# Patient Record
Sex: FEMALE | Race: WHITE | NOT HISPANIC OR LATINO | Employment: PART TIME | URBAN - METROPOLITAN AREA
[De-identification: names, ages, dates, MRNs, and addresses within clinical notes are randomized per-mention and may not be internally consistent; named-entity substitution may affect disease eponyms.]

---

## 2017-08-28 ENCOUNTER — ALLSCRIPTS OFFICE VISIT (OUTPATIENT)
Dept: OTHER | Facility: OTHER | Age: 22
End: 2017-08-28

## 2017-09-11 ENCOUNTER — ALLSCRIPTS OFFICE VISIT (OUTPATIENT)
Dept: OTHER | Facility: OTHER | Age: 22
End: 2017-09-11

## 2017-09-11 LAB — S PYO AG THROAT QL: POSITIVE

## 2018-01-18 NOTE — PROGRESS NOTES
Assessment    1  Encounter for preventive health examination (V70 0) (Z00 00)    Discussion/Summary  health maintenance visit Currently, she eats a healthy diet  Breast cancer screening: breast cancer screening is not indicated  Colorectal cancer screening: colorectal cancer screening is not indicated  The PT UNSURE OF TETANUS SHOT  Advice and education were given regarding contraception  Patient discussion: discussed with the patient  PATIENT HERE TO EST AS A NEW PATIENT   FOLLOW UP WITH GYN FOR POSSIBLE ? PCOS? - FOLLOW UP YEARLY   LABS AND ER VISIT REVIEWED  The patient was counseled regarding diagnostic results, instructions for management, risk factor reductions, prognosis, patient and family education, impressions, risks and benefits of treatment options, importance of compliance with treatment  Chief Complaint  PATIENT HERE TO EST AS A NEW PATIENT; History of Present Illness  HM, Adult Female: The patient is being seen for a health maintenance evaluation  The last health maintenance visit was month(s) ago  General Health: The patient's health since the last visit is described as good  She has regular dental visits  She denies vision problems  She denies hearing loss  Immunizations status: up to date  Lifestyle:  She exercises regularly  She does not use tobacco  She denies alcohol use  She denies drug use  Reproductive health: the patient is premenopausal   she reports normal menses  she uses no contraception  she is not sexually active  she denies prior pregnancies  Screening: cancer screening reviewed and current  metabolic screening reviewed and current  risk screening reviewed and current  HPI: PATIENT HERE TO EST AS A NEW PATIENT  SHE WAS DX WITH POSSIBLE      Review of Systems    Constitutional: No fever, no chills, feels well, no tiredness, no recent weight gain or weight loss     Eyes: No complaints of eye pain, no red eyes, no eyesight problems, no discharge, no dry eyes, no itching of eyes  ENT: no complaints of earache, no loss of hearing, no nose bleeds, no nasal discharge, no sore throat, no hoarseness  Cardiovascular: No complaints of slow heart rate, no fast heart rate, no chest pain, no palpitations, no leg claudication, no lower extremity edema, the heart rate was not slow, no chest pain, the heart rate was not fast and no palpitations  Respiratory: No complaints of shortness of breath, no wheezing, no cough, no SOB on exertion, no orthopnea, no PND  Gastrointestinal: No complaints of abdominal pain, no constipation, no nausea or vomiting, no diarrhea, no bloody stools, no abdominal pain, no nausea, no vomiting, no constipation and no diarrhea  Genitourinary: [facility] 7/20/2017, but No complaints of dysuria, no incontinence, no pelvic pain, no dysmenorrhea, no vaginal discharge or bleeding, no dysuria, no pelvic pain and no vaginal discharge  Musculoskeletal: No complaints of arthralgias, no myalgias, no joint swelling or stiffness, no limb pain or swelling, no arthralgias and no myalgias  Integumentary: No complaints of skin rash or lesions, no itching, no skin wounds, no breast pain or lump, no rashes, no skin lesions and no skin wound  Neurological: No complaints of headache, no confusion, no convulsions, no numbness, no dizziness or fainting, no tingling, no limb weakness, no difficulty walking, no confusion, no dizziness, no convulsions and no fainting  Psychiatric: Not suicidal, no sleep disturbance, no anxiety or depression, no change in personality, no emotional problems, no anxiety, no sleep disturbances and no depression  Endocrine: No complaints of proptosis, no hot flashes, no muscle weakness, no deepening of the voice, no feelings of weakness, no hot flashes and no deepening of the voice   no feelings of weakness   Hematologic/Lymphatic: No complaints of swollen glands, no swollen glands in the neck, does not bleed easily, does not bruise easily and no tendency for easy bruising  ROS reviewed  Family History  Mother    · No pertinent family history  Father    · No pertinent family history    Social History    · Never smoked cigarettes (V49 89) (Z78 9)    Current Meds   1  No Reported Medications Recorded    Allergies    1  Amoxil CAPS    2  No Known Environmental Allergies   3  No Known Food Allergies    Vitals   Recorded: [id number] 01:02PM   Temperature 99 4 F, Tympanic   Heart Rate 76   Respiration 16   Systolic 475, LUE, Sitting   Diastolic 60, LUE, Sitting   Height 5 ft 4 8 in   Weight 124 lb 8 oz   BMI Calculated 20 85   BSA Calculated 1 61     Physical Exam    Constitutional   General appearance: No acute distress, well appearing and well nourished  Eyes   Conjunctiva and lids: No swelling, erythema or discharge  Pupils and irises: Equal, round, reactive to light  Ophthalmoscopic examination: Normal fundi and optic discs  Ears, Nose, Mouth, and Throat   External inspection of ears and nose: Normal     Otoscopic examination: Tympanic membranes translucent with normal light reflex  Canals patent without erythema  Hearing: Normal     Nasal mucosa, septum, and turbinates: Normal without edema or erythema  Lips, teeth, and gums: Normal, good dentition  Oropharynx: Normal with no erythema, edema, exudate or lesions  Neck   Neck: Supple, symmetric, trachea midline, no masses  Thyroid: Normal, no thyromegaly  Pulmonary   Respiratory effort: No increased work of breathing or signs of respiratory distress  Percussion of chest: Normal     Palpation of chest: Normal     Auscultation of lungs: Clear to auscultation  Cardiovascular   Palpation of heart: Normal PMI, no thrills  Auscultation of heart: Normal rate and rhythm, normal S1 and S2, no murmurs  Carotid pulses: 2+ bilaterally  Pedal pulses: 2+ bilaterally      Examination of extremities for edema and/or varicosities: Normal     Abdomen   Abdomen: Non-tender, no masses  Liver and spleen: No hepatomegaly or splenomegaly  Lymphatic   Palpation of lymph nodes in neck: No lymphadenopathy  Musculoskeletal   Gait and station: Normal     Digits and nails: Normal without clubbing or cyanosis  Joints, bones, and muscles: Normal     Range of motion: Normal     Stability: Normal     Muscle strength/tone: Normal     Skin   Skin and subcutaneous tissue: Normal without rashes or lesions  Palpation of skin and subcutaneous tissue: Normal turgor  Neurologic   Cranial nerves: Cranial nerves II-XII intact  Reflexes: 2+ and symmetric  Sensation: No sensory loss  Psychiatric   Judgment and insight: Normal     Orientation to person, place, and time: Normal     Recent and remote memory: Intact  Mood and affect: Normal        Results/Data  PHQ-2 Adult Depression Screening [id number] 01:07PM User, s     Test Name Result Flag Reference   PHQ-2 Adult Depression Score 0     Over the last two weeks, how often have you been bothered by any of the following problems? Little interest or pleasure in doing things: Not at all - 0  Feeling down, depressed, or hopeless: Not at all - 0   PHQ-2 Adult Depression Screening Negative         Signatures   Electronically signed by :  Kolby Casey DO; Aug 28 2017  1:41PM EST                       (Author)

## 2018-01-22 VITALS
DIASTOLIC BLOOD PRESSURE: 60 MMHG | RESPIRATION RATE: 16 BRPM | HEART RATE: 76 BPM | TEMPERATURE: 99.4 F | WEIGHT: 124.5 LBS | HEIGHT: 65 IN | SYSTOLIC BLOOD PRESSURE: 112 MMHG | BODY MASS INDEX: 20.74 KG/M2

## 2018-01-22 VITALS
TEMPERATURE: 97.2 F | HEIGHT: 65 IN | HEART RATE: 60 BPM | DIASTOLIC BLOOD PRESSURE: 70 MMHG | BODY MASS INDEX: 20.16 KG/M2 | WEIGHT: 121 LBS | RESPIRATION RATE: 16 BRPM | SYSTOLIC BLOOD PRESSURE: 104 MMHG

## 2018-03-09 ENCOUNTER — TELEPHONE (OUTPATIENT)
Dept: FAMILY MEDICINE CLINIC | Facility: CLINIC | Age: 23
End: 2018-03-09

## 2018-03-09 NOTE — TELEPHONE ENCOUNTER
Pts friend has a rash under her bottom lip which was just dx as herpes   They were sharing a water bottle and the pt wants to know if she can get herpes that way,  And/or  is there anything she can do to prevent it

## 2018-03-12 NOTE — TELEPHONE ENCOUNTER
She can get it from direct contact but sharing a water bottle can do it- what is she being treated with? Topicals don't typically work-  Oral meds work - she will continue to  Have outbreaks now and we can prescribe oral meds when this happens

## 2018-03-19 ENCOUNTER — HOSPITAL ENCOUNTER (OUTPATIENT)
Dept: RADIOLOGY | Age: 23
Discharge: HOME | End: 2018-03-19
Payer: COMMERCIAL

## 2018-03-19 ENCOUNTER — OFFICE VISIT (OUTPATIENT)
Dept: FAMILY MEDICINE CLINIC | Facility: CLINIC | Age: 23
End: 2018-03-19
Payer: COMMERCIAL

## 2018-03-19 ENCOUNTER — TELEPHONE (OUTPATIENT)
Dept: FAMILY MEDICINE CLINIC | Facility: CLINIC | Age: 23
End: 2018-03-19

## 2018-03-19 VITALS
TEMPERATURE: 98.5 F | WEIGHT: 127 LBS | DIASTOLIC BLOOD PRESSURE: 78 MMHG | HEART RATE: 60 BPM | RESPIRATION RATE: 16 BRPM | SYSTOLIC BLOOD PRESSURE: 112 MMHG | BODY MASS INDEX: 20.41 KG/M2 | HEIGHT: 66 IN

## 2018-03-19 DIAGNOSIS — R10.31 ABDOMINAL PAIN, RLQ: Primary | ICD-10-CM

## 2018-03-19 DIAGNOSIS — R10.31 ABDOMINAL PAIN, RLQ: ICD-10-CM

## 2018-03-19 DIAGNOSIS — N83.201 RIGHT OVARIAN CYST: Primary | ICD-10-CM

## 2018-03-19 LAB
SL AMB  POCT GLUCOSE, UA: NEGATIVE
SL AMB LEUKOCYTE ESTERASE,UA: NEGATIVE
SL AMB POCT BILIRUBIN,UA: NEGATIVE
SL AMB POCT BLOOD,UA: NEGATIVE
SL AMB POCT CLARITY,UA: CLEAR
SL AMB POCT COLOR,UA: YELLOW
SL AMB POCT KETONES,UA: NEGATIVE
SL AMB POCT NITRITE,UA: NEGATIVE
SL AMB POCT PH,UA: 6
SL AMB POCT SPECIFIC GRAVITY,UA: 1.01
SL AMB POCT URINE HCG: NEGATIVE
SL AMB POCT URINE PROTEIN: NEGATIVE
SL AMB POCT UROBILINOGEN: NEGATIVE

## 2018-03-19 PROCEDURE — 76856 US EXAM PELVIC COMPLETE: CPT

## 2018-03-19 PROCEDURE — 76830 TRANSVAGINAL US NON-OB: CPT

## 2018-03-19 PROCEDURE — 81025 URINE PREGNANCY TEST: CPT | Performed by: INTERNAL MEDICINE

## 2018-03-19 PROCEDURE — 81002 URINALYSIS NONAUTO W/O SCOPE: CPT | Performed by: INTERNAL MEDICINE

## 2018-03-19 PROCEDURE — 99213 OFFICE O/P EST LOW 20 MIN: CPT | Performed by: INTERNAL MEDICINE

## 2018-03-19 NOTE — PROGRESS NOTES
ASSESSMENT and PLAN:  Mikayla Rees is a 25 y o  female with:   Problem List Items Addressed This Visit     Abdominal pain, RLQ - Primary    Relevant Orders    POCT urine HCG    POCT urine dip    US abdomen complete          SUBJECTIVE:  Mikayla Rees is a 25 y o  female who presents today with a chief complaint of Abdominal Pain (Lower Right Quadrant)    RLQ Pain started last wed/thursday- got worse on thrusday night- spread over her entire stomach; She made  Appt     fdlmp 2/11/18- none since; sexually active- uses condoms; She is here for evaluation; No urinary sx/ no fever; She was laying down when pain started- could not stand up due to pain;  Felt crampy, tight, rigid- different from menstrual cramps; No meds for it  Lasted until she fell asleep- gone when she awoke;        Review of Systems   Constitutional: Negative  HENT: Negative  Eyes: Negative  Respiratory: Negative  Cardiovascular: Negative  Gastrointestinal: Positive for abdominal pain (rlq pain- comes and goes;  radiates to umbilicus;  )  Negative for anal bleeding, blood in stool, constipation, diarrhea, nausea, rectal pain and vomiting  Endocrine: Negative  Genitourinary: Negative  Musculoskeletal: Negative  Skin: Negative  Allergic/Immunologic: Negative  Neurological: Negative  Hematological: Negative  Psychiatric/Behavioral: Negative  I have reviewed the patient's PMH, Social History, Medication List and Allergies  OBJECTIVE:  /78 (BP Location: Left arm, Patient Position: Sitting, Cuff Size: Large)   Pulse 60   Temp 98 5 °F (36 9 °C) (Tympanic)   Resp 16   Ht 5' 5 5" (1 664 m)   Wt 57 6 kg (127 lb)   LMP 02/11/2018   BMI 20 81 kg/m²   Physical Exam   Constitutional: She appears well-developed and well-nourished  HENT:   Head: Normocephalic and atraumatic     Right Ear: External ear normal    Left Ear: External ear normal    Eyes: EOM are normal  Pupils are equal, round, and reactive to light  Neck: Normal range of motion  Neck supple  No thyromegaly present  Cardiovascular: Normal rate, regular rhythm and normal heart sounds  Pulmonary/Chest: Effort normal and breath sounds normal  No respiratory distress  She has no wheezes  She has no rales  She exhibits no tenderness  Abdominal: Soft  She exhibits no distension  There is tenderness (rlq tenderness with some rebound;  some pain near umbilicus; nomral bs x4;  )  There is no rebound and no guarding  Musculoskeletal: Normal range of motion  Neurological: She is alert  Skin: Skin is warm and dry  Psychiatric: She has a normal mood and affect  Her behavior is normal  Judgment normal    Nursing note and vitals reviewed

## 2018-03-19 NOTE — TELEPHONE ENCOUNTER
Call pt- us showed ovarian cyst on right side- right where she is tender; So rigth now we can just use heating pad, advil or aleve, rest and time-  But we should repeat us in 6-12 weeks   I will order

## 2018-03-19 NOTE — TELEPHONE ENCOUNTER
PATIENT NOTIFIED OF RESULTS OF U/S AND TREATMENT PLAN  WILL CALL IF THERE ARE ANY SIGNIFICANT CHANGES  WILL REPEAT U/S IN 6 -12 WEEKS

## 2018-08-02 ENCOUNTER — OFFICE VISIT (OUTPATIENT)
Dept: FAMILY MEDICINE CLINIC | Facility: CLINIC | Age: 23
End: 2018-08-02
Payer: COMMERCIAL

## 2018-08-02 VITALS
SYSTOLIC BLOOD PRESSURE: 118 MMHG | WEIGHT: 127.2 LBS | HEART RATE: 64 BPM | TEMPERATURE: 98.2 F | HEIGHT: 66 IN | BODY MASS INDEX: 20.44 KG/M2 | DIASTOLIC BLOOD PRESSURE: 80 MMHG

## 2018-08-02 DIAGNOSIS — J01.40 ACUTE NON-RECURRENT PANSINUSITIS: ICD-10-CM

## 2018-08-02 DIAGNOSIS — J30.89 ENVIRONMENTAL AND SEASONAL ALLERGIES: Primary | ICD-10-CM

## 2018-08-02 PROCEDURE — 99214 OFFICE O/P EST MOD 30 MIN: CPT | Performed by: FAMILY MEDICINE

## 2018-08-02 PROCEDURE — 3008F BODY MASS INDEX DOCD: CPT | Performed by: FAMILY MEDICINE

## 2018-08-02 RX ORDER — FLUTICASONE PROPIONATE 50 MCG
1 SPRAY, SUSPENSION (ML) NASAL DAILY
Qty: 1 BOTTLE | Refills: 0 | Status: SHIPPED | OUTPATIENT
Start: 2018-08-02 | End: 2020-09-10

## 2018-08-02 RX ORDER — ALBUTEROL SULFATE 90 UG/1
2 AEROSOL, METERED RESPIRATORY (INHALATION) EVERY 6 HOURS PRN
COMMUNITY
End: 2019-05-01 | Stop reason: SDUPTHER

## 2018-08-02 RX ORDER — BENZONATATE 100 MG/1
100 CAPSULE ORAL 3 TIMES DAILY PRN
Qty: 30 CAPSULE | Refills: 0 | Status: SHIPPED | OUTPATIENT
Start: 2018-08-02 | End: 2018-08-12

## 2018-08-02 RX ORDER — FEXOFENADINE HCL 180 MG/1
180 TABLET ORAL DAILY
Qty: 30 TABLET | Refills: 0 | Status: SHIPPED | OUTPATIENT
Start: 2018-08-02 | End: 2020-09-10

## 2018-08-02 RX ORDER — DOXYCYCLINE HYCLATE 100 MG/1
100 TABLET, DELAYED RELEASE ORAL 2 TIMES DAILY
Qty: 14 TABLET | Refills: 0 | Status: SHIPPED | OUTPATIENT
Start: 2018-08-02 | End: 2018-08-09

## 2018-08-02 NOTE — PROGRESS NOTES
FAMILY MEDICINE PROGRESS NOTE  Caridad Castillo 25 y o  female   DATE: August 2, 2018     ASSESSMENT and PLAN:  Caridad Castillo is a 25 y o  female with:     1  Environmental and seasonal allergies   patient has postnasal drip and seasonal allergies that she has not been treating other than Benadryl p r n     Will start Flonase and Allegra daily  Also advised to use intranasal saline sprays or rinses  - fluticasone (FLONASE) 50 mcg/act nasal spray; 1 spray into each nostril daily for 30 days  Dispense: 1 Bottle; Refill: 0  - fexofenadine (ALLEGRA) 180 MG tablet; Take 1 tablet (180 mg total) by mouth daily  Dispense: 30 tablet; Refill: 0    2  Acute non-recurrent pansinusitis    Patient has had symptoms for the past month, likely due to untreated allergies  The patient does have a history of asthma, has a completely clear lung exam today, no evidence of asthma exacerbation, though she has been using her albuterol daily  Advised her that we will treat for sinusitis but if symptoms worsen she is to give us a call and would check a chest x-ray  Advised to get spirometry once she is at her baseline to determine if she indeed does still have asthma or may need a daily inhaled corticosteroid  - doxycycline (DORYX) 100 MG EC tablet; Take 1 tablet (100 mg total) by mouth 2 (two) times a day for 7 days  Dispense: 14 tablet; Refill: 0  - benzonatate (TESSALON PERLES) 100 mg capsule; Take 1 capsule (100 mg total) by mouth 3 (three) times a day as needed for cough for up to 10 days  Dispense: 30 capsule; Refill: 0      SUBJECTIVE:  Caridad Castillo is a 25 y o  female who presents today with a chief complaint of Asthma (chest congestion x 1 month) and Cough  Started gradually about 1 month ago and had been having nasal congestion, breathing issues, mucous-y, cough, now her ears are clogged  Initially thought it was allergies because of dust at the workplace  Tried Benadryl but it made her drowsy     Has been using her albuterol inhaler every day since this has been happening  Never been hospitalized or ER visits  Denies fevers, chills  No spirometry on file  Review of Systems   Constitutional: Negative for activity change, chills, fatigue and fever  HENT: Positive for congestion  Negative for ear pain, rhinorrhea, sinus pain, sinus pressure, sneezing and sore throat  Eyes: Negative for discharge  Respiratory: Positive for cough, chest tightness and shortness of breath  Cardiovascular: Negative for chest pain and palpitations (with albuterol)  Gastrointestinal: Negative for abdominal pain, diarrhea, nausea and vomiting  I have reviewed the patient's PMH, Social History, Medication List and Allergies as appropriate  OBJECTIVE:  /80 (BP Location: Left arm, Patient Position: Sitting, Cuff Size: Standard)   Pulse 64   Temp 98 2 °F (36 8 °C)   Ht 5' 5 5" (1 664 m)   Wt 57 7 kg (127 lb 3 2 oz)   LMP 07/25/2018   Breastfeeding? No   BMI 20 85 kg/m²    Physical Exam   Constitutional: She appears well-developed and well-nourished  No distress  HENT:   Head: Normocephalic and atraumatic  Right Ear: External ear normal    Left Ear: External ear normal    Nose: Rhinorrhea present  Right sinus exhibits maxillary sinus tenderness  Left sinus exhibits maxillary sinus tenderness  Mouth/Throat: Uvula is midline, oropharynx is clear and moist and mucous membranes are normal  No oropharyngeal exudate ( pale posterior oropharyngeal mucosa), posterior oropharyngeal edema, posterior oropharyngeal erythema or tonsillar abscesses  Eyes: Conjunctivae are normal  Right eye exhibits no discharge  Left eye exhibits no discharge  Neck: Normal range of motion  Neck supple  Cardiovascular: Normal rate and normal heart sounds  Pulmonary/Chest: Effort normal and breath sounds normal  No respiratory distress  She has no wheezes  She has no rales     Lymphadenopathy:     She has no cervical adenopathy  Skin: She is not diaphoretic  Vitals reviewed  Sheryl Abrams MD    Note: Portions of the record may have been created with voice recognition software  Occasional wrong word or "sound a like" substitutions may have occurred due to the inherent limitations of voice recognition software  Read the chart carefully and recognize, using context, where substitutions have occurred

## 2018-11-07 ENCOUNTER — OFFICE VISIT (OUTPATIENT)
Dept: FAMILY MEDICINE CLINIC | Facility: CLINIC | Age: 23
End: 2018-11-07
Payer: COMMERCIAL

## 2018-11-07 VITALS
HEIGHT: 66 IN | WEIGHT: 124.4 LBS | TEMPERATURE: 98.3 F | DIASTOLIC BLOOD PRESSURE: 60 MMHG | HEART RATE: 80 BPM | RESPIRATION RATE: 18 BRPM | SYSTOLIC BLOOD PRESSURE: 98 MMHG | BODY MASS INDEX: 19.99 KG/M2

## 2018-11-07 DIAGNOSIS — Z30.09 BIRTH CONTROL COUNSELING: ICD-10-CM

## 2018-11-07 DIAGNOSIS — N39.0 ACUTE UTI: Primary | ICD-10-CM

## 2018-11-07 DIAGNOSIS — Z11.3 SCREEN FOR STD (SEXUALLY TRANSMITTED DISEASE): ICD-10-CM

## 2018-11-07 DIAGNOSIS — R31.0 GROSS HEMATURIA: ICD-10-CM

## 2018-11-07 LAB
SL AMB  POCT GLUCOSE, UA: NEGATIVE
SL AMB LEUKOCYTE ESTERASE,UA: 500
SL AMB POCT BILIRUBIN,UA: NEGATIVE
SL AMB POCT BLOOD,UA: ABNORMAL
SL AMB POCT CLARITY,UA: ABNORMAL
SL AMB POCT COLOR,UA: YELLOW
SL AMB POCT KETONES,UA: NEGATIVE
SL AMB POCT NITRITE,UA: NEGATIVE
SL AMB POCT PH,UA: 6
SL AMB POCT SPECIFIC GRAVITY,UA: 1.01
SL AMB POCT URINE PROTEIN: 15
SL AMB POCT UROBILINOGEN: 0.2

## 2018-11-07 PROCEDURE — 3008F BODY MASS INDEX DOCD: CPT | Performed by: FAMILY MEDICINE

## 2018-11-07 PROCEDURE — 99214 OFFICE O/P EST MOD 30 MIN: CPT | Performed by: FAMILY MEDICINE

## 2018-11-07 PROCEDURE — 81002 URINALYSIS NONAUTO W/O SCOPE: CPT | Performed by: FAMILY MEDICINE

## 2018-11-07 PROCEDURE — 1036F TOBACCO NON-USER: CPT | Performed by: FAMILY MEDICINE

## 2018-11-07 RX ORDER — NITROFURANTOIN 25; 75 MG/1; MG/1
100 CAPSULE ORAL 2 TIMES DAILY
Qty: 10 CAPSULE | Refills: 0 | Status: SHIPPED | OUTPATIENT
Start: 2018-11-07 | End: 2018-11-07 | Stop reason: SDUPTHER

## 2018-11-07 RX ORDER — NITROFURANTOIN 25; 75 MG/1; MG/1
100 CAPSULE ORAL 2 TIMES DAILY
Qty: 10 CAPSULE | Refills: 0 | Status: SHIPPED | OUTPATIENT
Start: 2018-11-07 | End: 2018-11-12

## 2018-11-07 NOTE — PROGRESS NOTES
FAMILY MEDICINE PROGRESS NOTE  Yin Clarke 25 y o  female   DATE: November 7, 2018     ASSESSMENT and PLAN:  Yin Clarke is a 25 y o  female with:     1  Acute UTI  Classic symptoms of urinary urgency, frequency, dysuria without history of recurrent UTIs or pyelonephritis  Urine dip positive for LE and blood, negative for nitrites  Will treat empirically and send for Cx   - nitrofurantoin (MACROBID) 100 mg capsule; Take 1 capsule (100 mg total) by mouth 2 (two) times a day for 5 days  Dispense: 10 capsule; Refill: 0    2  Screen for STD (sexually transmitted disease)  - Chlamydia/GC amplified DNA by PCR    3  Gross hematuria  Likely related to UTI, but will get repeat in 3 weeks when she is not on her period and symptoms have resolved, to ensure it has improved  - UA w Reflex to Microscopic w Reflex to Culture    4  Birth control counseling  Currently sexually active, never been screened for STDs, encouraged condom use and discussed contraceptive options especially given her ovarian cysts  SUBJECTIVE:  Yin Clarke is a 25 y o  female who presents today with a chief complaint of Blood in Urine (Bloody painful urination)  She started 4 days ago with urinary frequency, dysuria  She first noticed blood in her urine 3 days ago and it was very light  She tried Azo on an empty stomach and that made her vomit, but otherwise no nausea/vomiting, fevers/chills  The Azo did seem to help  LMP is possibly today, she has ovarian cysts and gets it every other month  Never been on birth control in the past because of Alevism reasons  Never been screened for STD before  Has a history of ovarian cyst       Urinary Tract Infection    This is a new problem  The current episode started in the past 7 days  The problem occurs every urination  The quality of the pain is described as burning  There has been no fever  She is sexually active  There is no history of pyelonephritis   Associated symptoms include frequency, hematuria, hesitancy and urgency  Pertinent negatives include no chills, discharge, flank pain, nausea, possible pregnancy, sweats or vomiting  She has tried nothing for the symptoms  There is no history of recurrent UTIs  Review of Systems   Constitutional: Negative for chills  Gastrointestinal: Negative for nausea and vomiting  Genitourinary: Positive for frequency, hematuria, hesitancy and urgency  Negative for flank pain  I have reviewed the patient's PMH, Social History, Medication List and Allergies as appropriate  OBJECTIVE:  BP 98/60 (BP Location: Left arm, Patient Position: Sitting, Cuff Size: Standard)   Pulse 80   Temp 98 3 °F (36 8 °C)   Resp 18   Ht 5' 5 5" (1 664 m)   Wt 56 4 kg (124 lb 6 4 oz)   LMP 11/07/2018   Breastfeeding? No   BMI 20 39 kg/m²    Physical Exam   Constitutional: She appears well-developed and well-nourished  No distress  Cardiovascular: Normal rate, regular rhythm and normal heart sounds  Pulmonary/Chest: Effort normal and breath sounds normal  No respiratory distress  She has no wheezes  She has no rales  Abdominal: Soft  Bowel sounds are normal  She exhibits no distension  There is no tenderness  There is no rigidity, no rebound, no guarding and no CVA tenderness  Suprapubic tenderness   Skin: She is not diaphoretic  Vitals reviewed  Jayshree Walter MD    Note: Portions of the record may have been created with voice recognition software  Occasional wrong word or "sound a like" substitutions may have occurred due to the inherent limitations of voice recognition software  Read the chart carefully and recognize, using context, where substitutions have occurred

## 2018-11-09 ENCOUNTER — TELEPHONE (OUTPATIENT)
Dept: FAMILY MEDICINE CLINIC | Facility: CLINIC | Age: 23
End: 2018-11-09

## 2018-11-10 NOTE — TELEPHONE ENCOUNTER
Please call Geno Dill and let her know that her labs showed: her urine culture was negative for UTI, if she is still having blood in her urine though, she needs to see Urology, if she isn't, just have her get the repeat urine tests in 2-3 weeks    Thank you!

## 2018-11-13 LAB
APPEARANCE UR: CLEAR
BACTERIA UR CULT: NORMAL
BACTERIA UR QL AUTO: ABNORMAL /HPF
BILIRUB UR QL STRIP: NEGATIVE
COLOR UR: YELLOW
GLUCOSE UR QL STRIP: NEGATIVE
HGB UR QL STRIP: ABNORMAL
HYALINE CASTS #/AREA URNS LPF: ABNORMAL /LPF
KETONES UR QL STRIP: NEGATIVE
LEUKOCYTE ESTERASE UR QL STRIP: ABNORMAL
NITRITE UR QL STRIP: NEGATIVE
PH UR STRIP: 6 [PH] (ref 5–8)
PROT UR QL STRIP: ABNORMAL
RBC #/AREA URNS HPF: ABNORMAL /HPF
SP GR UR STRIP: 1.01 (ref 1–1.03)
SQUAMOUS #/AREA URNS HPF: ABNORMAL /HPF
WBC #/AREA URNS HPF: ABNORMAL /HPF

## 2019-05-01 ENCOUNTER — OFFICE VISIT (OUTPATIENT)
Dept: URGENT CARE | Age: 24
End: 2019-05-01
Payer: COMMERCIAL

## 2019-05-01 VITALS
DIASTOLIC BLOOD PRESSURE: 69 MMHG | TEMPERATURE: 97.3 F | BODY MASS INDEX: 21.66 KG/M2 | HEIGHT: 65 IN | WEIGHT: 130 LBS | SYSTOLIC BLOOD PRESSURE: 135 MMHG | RESPIRATION RATE: 18 BRPM | OXYGEN SATURATION: 97 % | HEART RATE: 82 BPM

## 2019-05-01 DIAGNOSIS — J30.9 ALLERGIC RHINITIS, UNSPECIFIED SEASONALITY, UNSPECIFIED TRIGGER: ICD-10-CM

## 2019-05-01 DIAGNOSIS — J02.9 SORE THROAT: ICD-10-CM

## 2019-05-01 DIAGNOSIS — J30.89 ENVIRONMENTAL AND SEASONAL ALLERGIES: ICD-10-CM

## 2019-05-01 DIAGNOSIS — J30.89 ENVIRONMENTAL AND SEASONAL ALLERGIES: Primary | ICD-10-CM

## 2019-05-01 DIAGNOSIS — J45.901 ASTHMA WITH ACUTE EXACERBATION, UNSPECIFIED ASTHMA SEVERITY, UNSPECIFIED WHETHER PERSISTENT: Primary | ICD-10-CM

## 2019-05-01 LAB — S PYO AG THROAT QL: NEGATIVE

## 2019-05-01 PROCEDURE — 87430 STREP A AG IA: CPT | Performed by: FAMILY MEDICINE

## 2019-05-01 PROCEDURE — G0382 LEV 3 HOSP TYPE B ED VISIT: HCPCS | Performed by: FAMILY MEDICINE

## 2019-05-01 RX ORDER — ALBUTEROL SULFATE 90 UG/1
2 AEROSOL, METERED RESPIRATORY (INHALATION) EVERY 6 HOURS PRN
Qty: 1 INHALER | Refills: 3 | Status: SHIPPED | OUTPATIENT
Start: 2019-05-01

## 2019-05-01 RX ORDER — FLUTICASONE PROPIONATE 50 MCG
1 SPRAY, SUSPENSION (ML) NASAL DAILY
Qty: 1 BOTTLE | Refills: 0 | Status: SHIPPED | OUTPATIENT
Start: 2019-05-01 | End: 2019-08-20 | Stop reason: SDUPTHER

## 2019-05-01 RX ORDER — METHYLPREDNISOLONE 4 MG/1
TABLET ORAL
Qty: 21 TABLET | Refills: 0 | Status: SHIPPED | OUTPATIENT
Start: 2019-05-01 | End: 2019-08-20 | Stop reason: ALTCHOICE

## 2019-05-03 ENCOUNTER — TELEPHONE (OUTPATIENT)
Dept: URGENT CARE | Age: 24
End: 2019-05-03

## 2019-05-03 DIAGNOSIS — J30.9 ALLERGIC RHINITIS, UNSPECIFIED SEASONALITY, UNSPECIFIED TRIGGER: Primary | ICD-10-CM

## 2019-05-03 RX ORDER — MOMETASONE FUROATE 50 UG/1
2 SPRAY, METERED NASAL DAILY
Qty: 1 ACT | Refills: 0 | Status: SHIPPED | OUTPATIENT
Start: 2019-05-03 | End: 2019-06-02 | Stop reason: SDUPTHER

## 2019-06-02 DIAGNOSIS — J30.9 ALLERGIC RHINITIS, UNSPECIFIED SEASONALITY, UNSPECIFIED TRIGGER: ICD-10-CM

## 2019-06-03 RX ORDER — MOMETASONE FUROATE 50 UG/1
SPRAY, METERED NASAL
Qty: 1 ACT | Refills: 0 | Status: SHIPPED | OUTPATIENT
Start: 2019-06-03 | End: 2019-08-20 | Stop reason: ALTCHOICE

## 2019-08-20 ENCOUNTER — OFFICE VISIT (OUTPATIENT)
Dept: FAMILY MEDICINE CLINIC | Facility: CLINIC | Age: 24
End: 2019-08-20
Payer: COMMERCIAL

## 2019-08-20 ENCOUNTER — TELEPHONE (OUTPATIENT)
Dept: FAMILY MEDICINE CLINIC | Facility: CLINIC | Age: 24
End: 2019-08-20

## 2019-08-20 VITALS
BODY MASS INDEX: 21.58 KG/M2 | HEIGHT: 65 IN | TEMPERATURE: 98.7 F | HEART RATE: 84 BPM | RESPIRATION RATE: 16 BRPM | WEIGHT: 129.5 LBS | OXYGEN SATURATION: 97 % | DIASTOLIC BLOOD PRESSURE: 70 MMHG | SYSTOLIC BLOOD PRESSURE: 106 MMHG

## 2019-08-20 DIAGNOSIS — Z23 NEED FOR VACCINATION: Primary | ICD-10-CM

## 2019-08-20 DIAGNOSIS — Z11.3 SCREEN FOR STD (SEXUALLY TRANSMITTED DISEASE): ICD-10-CM

## 2019-08-20 DIAGNOSIS — Z00.00 PHYSICAL EXAM: ICD-10-CM

## 2019-08-20 LAB
SL AMB  POCT GLUCOSE, UA: ABNORMAL
SL AMB LEUKOCYTE ESTERASE,UA: ABNORMAL
SL AMB POCT BILIRUBIN,UA: ABNORMAL
SL AMB POCT BLOOD,UA: ABNORMAL
SL AMB POCT CLARITY,UA: CLEAR
SL AMB POCT COLOR,UA: ABNORMAL
SL AMB POCT KETONES,UA: ABNORMAL
SL AMB POCT NITRITE,UA: ABNORMAL
SL AMB POCT PH,UA: 9
SL AMB POCT SPECIFIC GRAVITY,UA: 1.02
SL AMB POCT URINE PROTEIN: ABNORMAL
SL AMB POCT UROBILINOGEN: ABNORMAL

## 2019-08-20 PROCEDURE — 90471 IMMUNIZATION ADMIN: CPT

## 2019-08-20 PROCEDURE — 92551 PURE TONE HEARING TEST AIR: CPT | Performed by: FAMILY MEDICINE

## 2019-08-20 PROCEDURE — 99385 PREV VISIT NEW AGE 18-39: CPT | Performed by: FAMILY MEDICINE

## 2019-08-20 PROCEDURE — 81002 URINALYSIS NONAUTO W/O SCOPE: CPT | Performed by: FAMILY MEDICINE

## 2019-08-20 PROCEDURE — 99173 VISUAL ACUITY SCREEN: CPT | Performed by: FAMILY MEDICINE

## 2019-08-20 PROCEDURE — 90715 TDAP VACCINE 7 YRS/> IM: CPT

## 2019-08-20 NOTE — PROGRESS NOTES
[facility] 21 y o  female   DATE: August 20, 2019     Assessment and Plan:  21 y o  female exam      1  Health Maintenance  - Pap? Will establish with Gyn or return here  - Labs: Urine Gc/Chlamydia and Urine Cx to rule out UTI  - Immunizations: No immunizations on file, pt thinks her last shots were >5 years ago  Encouraged MenB vaccine, TDaP today  Will request records from Pediatrician, will hold on to form  2  Discussed the patient's BMI with her, (Body mass index is 21 35 kg/m²  )  Advised a healthy, balanced diet and regular exercise for 30 minutes 4-5 times a week  Follow up next physical in 1 year  Subjective:    Lashon Maher is a 21 y o  female and is here for a comprehensive physical exam    Has an albuterol inhaler that she uses for bad allergies  She doesn't use her Allegra and Flonase PRN  Sexually active, not on on OCPs, uses condoms for birth control  Doesn't want birth control  No STDs in the past    Thinks she may have a UTI but tried Azo  Has a physical form for [facility], 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  Histories Updated and Reviewed 8/20/2019:  Patient's Medications   New Prescriptions    No medications on file   Previous Medications    ALBUTEROL (PROVENTIL HFA,VENTOLIN HFA) 90 MCG/ACT INHALER    Inhale 2 puffs every 6 (six) hours as needed for wheezing    FEXOFENADINE (ALLEGRA) 180 MG TABLET    Take 1 tablet (180 mg total) by mouth daily    FLUTICASONE (FLONASE) 50 MCG/ACT NASAL SPRAY    1 spray into each nostril daily for 30 days   Modified Medications    No medications on file   Discontinued Medications    FLUTICASONE (FLONASE) 50 MCG/ACT NASAL SPRAY    1 spray into each nostril daily    METHYLPREDNISOLONE (MEDROL) 4 MG TABLET    Medrol dose pack Take as directed      MOMETASONE (NASONEX) 50 MCG/ACT NASAL SPRAY    SPRAY 2 SPRAYS INTO EACH NOSTRIL EVERY DAY FOR 14 DAYS     Allergies   Allergen Reactions    Amoxicillin     Erythromycin      No past medical history on file  Social History     Socioeconomic History    Marital status: Single     Spouse name: Not on file    Number of children: Not on file    Years of education: Not on file    Highest education level: Not on file   Occupational History    Not on file   Social Needs    Financial resource strain: Not on file    Food insecurity:     Worry: Not on file     Inability: Not on file    Transportation needs:     Medical: Not on file     Non-medical: Not on file   Tobacco Use    Smoking status: Never Smoker    Smokeless tobacco: Never Used   Substance and Sexual Activity    Alcohol use: Yes     Comment: Socially    Drug use: No    Sexual activity: Yes     Partners: Male     Birth control/protection: Condom   Lifestyle    Physical activity:     Days per week: Not on file     Minutes per session: Not on file    Stress: Not on file   Relationships    Social connections:     Talks on phone: Not on file     Gets together: Not on file     Attends Yarsanism service: Not on file     Active member of club or organization: Not on file     Attends meetings of clubs or organizations: Not on file     Relationship status: Not on file    Intimate partner violence:     Fear of current or ex partner: Not on file     Emotionally abused: Not on file     Physically abused: Not on file     Forced sexual activity: Not on file   Other Topics Concern    Not on file   Social History Narrative    Not on file       There is no immunization history on file for this patient  Review of Systems:  Review of Systems   Constitutional: Negative for chills and fever  HENT: Negative for ear pain  Eyes: Negative for visual disturbance  Respiratory: Negative for cough and shortness of breath  Cardiovascular: Negative for chest pain and palpitations  Gastrointestinal: Negative for abdominal pain, diarrhea, nausea and vomiting     Genitourinary: Positive for difficulty urinating and dysuria  Skin: Negative for rash  Neurological: Negative for headaches  Psychiatric/Behavioral: Negative for dysphoric mood  PHQ-9 Depression Screening    PHQ-9:    Frequency of the following problems over the past two weeks:       Little interest or pleasure in doing things:  0 - not at all  Feeling down, depressed, or hopeless:  0 - not at all  PHQ-2 Score:  0         Objective:  /70   Pulse 84   Temp 98 7 °F (37 1 °C)   Resp 16   Ht 5' 5 3" (1 659 m)   Wt 58 7 kg (129 lb 8 oz)   LMP 08/13/2019 (Exact Date)   SpO2 97%   Breastfeeding? No   BMI 21 35 kg/m²   Physical Exam   Constitutional: She is oriented to person, place, and time  She appears well-developed and well-nourished  No distress  HENT:   Head: Normocephalic and atraumatic  Mouth/Throat: Oropharynx is clear and moist  No oropharyngeal exudate  Eyes: Pupils are equal, round, and reactive to light  EOM are normal  Right eye exhibits no discharge  Left eye exhibits no discharge  Wears contacts   Neck: Normal range of motion  Neck supple  No JVD present  Cardiovascular: Normal rate, regular rhythm and normal heart sounds  No murmur heard  Pulmonary/Chest: Effort normal and breath sounds normal  No stridor  No respiratory distress  She has no wheezes  Abdominal: Soft  Bowel sounds are normal  There is no tenderness  There is no rebound and no guarding  Musculoskeletal: Normal range of motion  She exhibits no edema or tenderness  Neurological: She is alert and oriented to person, place, and time  Skin: Skin is warm and dry  She is not diaphoretic  No erythema  Psychiatric: She has a normal mood and affect  Her behavior is normal    Vitals reviewed         Hearing Screening    125Hz 250Hz 500Hz 1000Hz 2000Hz 3000Hz 4000Hz 6000Hz 8000Hz   Right ear:   Pass Pass Pass  Pass     Left ear:   Pass Pass Pass  Pass        Visual Acuity Screening    Right eye Left eye Both eyes   Without correction: 20/15 20/20 20/15   With correction:          Patient Care Team:  Deborah Garcia MD as PCP - General (Family Medicine)    Deborah Garcia MD    Note: Portions of the record may have been created with voice recognition software  Occasional wrong word or "sound a like" substitutions may have occurred due to the inherent limitations of voice recognition software  Read the chart carefully and recognize, using context, where substitutions have occurred

## 2019-08-21 ENCOUNTER — CLINICAL SUPPORT (OUTPATIENT)
Dept: FAMILY MEDICINE CLINIC | Facility: CLINIC | Age: 24
End: 2019-08-21
Payer: COMMERCIAL

## 2019-08-21 DIAGNOSIS — Z23 NEED FOR MENINGOCOCCAL VACCINATION: Primary | ICD-10-CM

## 2019-08-21 DIAGNOSIS — Z11.3 SCREEN FOR STD (SEXUALLY TRANSMITTED DISEASE): Primary | ICD-10-CM

## 2019-08-21 PROCEDURE — 87591 N.GONORRHOEAE DNA AMP PROB: CPT | Performed by: FAMILY MEDICINE

## 2019-08-21 PROCEDURE — 87491 CHLMYD TRACH DNA AMP PROBE: CPT | Performed by: FAMILY MEDICINE

## 2019-08-21 PROCEDURE — 90471 IMMUNIZATION ADMIN: CPT

## 2019-08-21 PROCEDURE — 90734 MENACWYD/MENACWYCRM VACC IM: CPT

## 2019-08-21 NOTE — TELEPHONE ENCOUNTER
Updated immunizations    Patient has an appointment today for [facility]

## 2019-08-23 LAB
APPEARANCE UR: CLEAR
BACTERIA UR CULT: NORMAL
BACTERIA UR QL AUTO: NORMAL /HPF
BILIRUB UR QL STRIP: NEGATIVE
C TRACH DNA SPEC QL NAA+PROBE: NEGATIVE
COLOR UR: YELLOW
GLUCOSE UR QL STRIP: NEGATIVE
HGB UR QL STRIP: NEGATIVE
HYALINE CASTS #/AREA URNS LPF: NORMAL /LPF
KETONES UR QL STRIP: NEGATIVE
LEUKOCYTE ESTERASE UR QL STRIP: NEGATIVE
N GONORRHOEA DNA SPEC QL NAA+PROBE: NEGATIVE
NITRITE UR QL STRIP: NEGATIVE
PH UR STRIP: 8 [PH] (ref 5–8)
PROT UR QL STRIP: NEGATIVE
RBC #/AREA URNS HPF: NORMAL /HPF
SP GR UR STRIP: 1.02 (ref 1–1.03)
SQUAMOUS #/AREA URNS HPF: NORMAL /HPF
WBC #/AREA URNS HPF: NORMAL /HPF

## 2019-08-27 LAB
C TRACH RRNA SPEC QL NAA+PROBE: NORMAL
QUESTION/PROBLEM: NORMAL
SPECIMEN(S) RECEIVED: NORMAL

## 2020-09-10 ENCOUNTER — OFFICE VISIT (OUTPATIENT)
Dept: FAMILY MEDICINE CLINIC | Facility: CLINIC | Age: 25
End: 2020-09-10
Payer: COMMERCIAL

## 2020-09-10 VITALS
BODY MASS INDEX: 22.36 KG/M2 | HEIGHT: 64 IN | WEIGHT: 131 LBS | DIASTOLIC BLOOD PRESSURE: 60 MMHG | HEART RATE: 93 BPM | OXYGEN SATURATION: 99 % | RESPIRATION RATE: 16 BRPM | TEMPERATURE: 99.2 F | SYSTOLIC BLOOD PRESSURE: 98 MMHG

## 2020-09-10 DIAGNOSIS — T78.40XA ALLERGIC REACTION, INITIAL ENCOUNTER: Primary | ICD-10-CM

## 2020-09-10 PROCEDURE — 99213 OFFICE O/P EST LOW 20 MIN: CPT | Performed by: FAMILY MEDICINE

## 2020-09-10 PROCEDURE — 1036F TOBACCO NON-USER: CPT | Performed by: FAMILY MEDICINE

## 2020-09-10 NOTE — PROGRESS NOTES
FAMILY MEDICINE PROGRESS NOTE  Mikayla Rees 25 y o  female   DATE: September 10, 2020     ASSESSMENT and PLAN:  Mikayla Rees is a 25 y o  female with:     Problem List Items Addressed This Visit     None      Visit Diagnoses     Allergic reaction, initial encounter    -  Primary        Likely allergic reaction to wasps stings, could be bee sting erratically  Reviewed supportive care including Allegra or Claritin instead of Benadryl since it will be less sedating  Okay to combine with Benadryl q h s  P r breanna Fairbanks Patient to call in if persists, can add Pepcid or steroids p kamille Fairbanks Patient agreeable with the plan and expressed understanding  I discucssed signs and symptoms for which to RTC, go to ER or seek urgent medical care  SUBJECTIVE:  Mikayla Rees is a 25 y o  female who presents today with a chief complaint of Follow-up  She presents for evaluation of possible bee or wasp sting about 3 days ago  She got stung on her left lower leg, had swelling and redness of the leg and is slightly worsened, but then she tried Benadryl and since yesterday the swelling and redness have gone down  She denies any anaphylactic symptoms, no prior history of allergies to bee or wasp stings  No pain or itching at this time  Review of Systems   Skin: Positive for rash  Negative for wound  Allergic/Immunologic: Positive for environmental allergies  I have reviewed the patient's Past Medical History  OBJECTIVE:  BP 98/60   Pulse 93   Temp 99 2 °F (37 3 °C)   Resp 16   Ht 5' 4 2" (1 631 m)   Wt 59 4 kg (131 lb)   LMP 08/18/2020 (Exact Date)   SpO2 99%   Breastfeeding No   BMI 22 35 kg/m²    Physical Exam  Constitutional:       Appearance: Normal appearance  Skin:         Neurological:      Mental Status: She is alert  Jeremias Abrams MD    Note: Portions of the record have been created with voice recognition software    Occasional wrong word or "sound a like" substitutions may have occurred due to the inherent limitations of voice recognition software  Read the chart carefully and recognize, using context, where substitutions have occurred

## 2021-07-15 ENCOUNTER — OFFICE VISIT (OUTPATIENT)
Dept: URGENT CARE | Age: 26
End: 2021-07-15
Payer: COMMERCIAL

## 2021-07-15 VITALS
SYSTOLIC BLOOD PRESSURE: 137 MMHG | RESPIRATION RATE: 20 BRPM | OXYGEN SATURATION: 99 % | HEART RATE: 75 BPM | TEMPERATURE: 98 F | DIASTOLIC BLOOD PRESSURE: 86 MMHG

## 2021-07-15 DIAGNOSIS — R35.0 URINARY FREQUENCY: Primary | ICD-10-CM

## 2021-07-15 DIAGNOSIS — R30.0 DYSURIA: ICD-10-CM

## 2021-07-15 LAB
SL AMB  POCT GLUCOSE, UA: NEGATIVE
SL AMB LEUKOCYTE ESTERASE,UA: ABNORMAL
SL AMB POCT BILIRUBIN,UA: NEGATIVE
SL AMB POCT BLOOD,UA: ABNORMAL
SL AMB POCT CLARITY,UA: CLEAR
SL AMB POCT COLOR,UA: YELLOW
SL AMB POCT KETONES,UA: NEGATIVE
SL AMB POCT NITRITE,UA: NEGATIVE
SL AMB POCT PH,UA: 5
SL AMB POCT SPECIFIC GRAVITY,UA: 1.01
SL AMB POCT URINE PROTEIN: NEGATIVE
SL AMB POCT UROBILINOGEN: 0.2

## 2021-07-15 PROCEDURE — 87077 CULTURE AEROBIC IDENTIFY: CPT | Performed by: NURSE PRACTITIONER

## 2021-07-15 PROCEDURE — 87086 URINE CULTURE/COLONY COUNT: CPT | Performed by: NURSE PRACTITIONER

## 2021-07-15 PROCEDURE — G0382 LEV 3 HOSP TYPE B ED VISIT: HCPCS | Performed by: NURSE PRACTITIONER

## 2021-07-15 PROCEDURE — 87186 SC STD MICRODIL/AGAR DIL: CPT | Performed by: NURSE PRACTITIONER

## 2021-07-15 PROCEDURE — 81002 URINALYSIS NONAUTO W/O SCOPE: CPT | Performed by: NURSE PRACTITIONER

## 2021-07-15 RX ORDER — NITROFURANTOIN 25; 75 MG/1; MG/1
100 CAPSULE ORAL 2 TIMES DAILY
Qty: 10 CAPSULE | Refills: 0 | Status: SHIPPED | OUTPATIENT
Start: 2021-07-15

## 2021-07-15 NOTE — PROGRESS NOTES
[facility]        NAME: James Traylor is a 22 y o  female  : 1995    MRN: [id number]  DATE: July 15, 2021  TIME: 4:11 PM    Assessment and Plan   Urinary frequency [R35 0]  1  Urinary frequency  POCT urine dip   2  Dysuria  nitrofurantoin (MACROBID) 100 mg capsule         Patient Instructions     Urine dip is positive for blood and leuks esterace  Will send for culture  In the meantime, start antibiotics   Follow up with PCP in 3-5 days  Proceed to  ER if symptoms worsen  Chief Complaint     Chief Complaint   Patient presents with    Possible UTI     pt states started last pm with urinary frequency         History of Present Illness       HPI   Urinary symptoms since yesterday  Says she has urinary frequency and sometimes only a small amount of urine comes out and it is painful to urinate  Review of Systems   Review of Systems   Constitutional: Negative for chills and fever  Respiratory: Negative for cough, shortness of breath and wheezing  Cardiovascular: Negative for chest pain  Gastrointestinal: Negative for abdominal pain  Genitourinary: Positive for dysuria and frequency  Negative for difficulty urinating, hematuria and urgency  Musculoskeletal: Negative for back pain  Neurological: Negative for headaches           Current Medications       Current Outpatient Medications:     albuterol (PROVENTIL HFA,VENTOLIN HFA) 90 mcg/act inhaler, Inhale 2 puffs every 6 (six) hours as needed for wheezing, Disp: 1 Inhaler, Rfl: 3    nitrofurantoin (MACROBID) 100 mg capsule, Take 1 capsule (100 mg total) by mouth 2 (two) times a day, Disp: 10 capsule, Rfl: 0    Current Allergies     Allergies as of 07/15/2021 - Reviewed 07/15/2021   Allergen Reaction Noted    Amoxicillin  2017    Erythromycin  2016            The following portions of the patient's history were reviewed and updated as appropriate: allergies, current medications, past family history, past medical history, past social history, past surgical history and problem list      History reviewed  No pertinent past medical history  History reviewed  No pertinent surgical history  Family History   Problem Relation Age of Onset    No Known Problems Mother     No Known Problems Father          Medications have been verified  Objective   /86   Pulse 75   Temp 98 °F (36 7 °C)   Resp 20   LMP 07/07/2021 (Approximate)   SpO2 99%   Patient's last menstrual period was 07/07/2021 (approximate)  Physical Exam     Physical Exam  Constitutional:       General: She is not in acute distress  Appearance: She is not ill-appearing or diaphoretic  Abdominal:      General: Abdomen is flat  Bowel sounds are normal  There is no distension  Tenderness: There is abdominal tenderness (suprapubic area)  There is no right CVA tenderness, left CVA tenderness, guarding or rebound

## 2021-07-17 LAB — BACTERIA UR CULT: ABNORMAL

## 2023-05-02 ENCOUNTER — APPOINTMENT (OUTPATIENT)
Dept: RADIOLOGY | Age: 28
End: 2023-05-02

## 2023-05-02 ENCOUNTER — OFFICE VISIT (OUTPATIENT)
Dept: URGENT CARE | Age: 28
End: 2023-05-02

## 2023-05-02 VITALS
OXYGEN SATURATION: 99 % | SYSTOLIC BLOOD PRESSURE: 124 MMHG | RESPIRATION RATE: 12 BRPM | HEART RATE: 72 BPM | DIASTOLIC BLOOD PRESSURE: 67 MMHG | TEMPERATURE: 98.1 F
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
## 2023-05-02 NOTE — PATIENT INSTRUCTIONS
X-rays reviewed, no acute abnormality noted, awaiting official read  May alternate Tylenol and ibuprofen as needed  May apply ice/heat as needed  Follow-up with primary care provider if symptoms do not significantly improve within 1 week  Present to emergency department if neck, abdominal or chest pain worsens
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
